# Patient Record
Sex: FEMALE | Race: WHITE
[De-identification: names, ages, dates, MRNs, and addresses within clinical notes are randomized per-mention and may not be internally consistent; named-entity substitution may affect disease eponyms.]

---

## 2020-12-04 ENCOUNTER — HOSPITAL ENCOUNTER (INPATIENT)
Dept: HOSPITAL 47 - EC | Age: 55
LOS: 4 days | Discharge: HOME HEALTH | DRG: 177 | End: 2020-12-08
Attending: HOSPITALIST | Admitting: HOSPITALIST
Payer: COMMERCIAL

## 2020-12-04 DIAGNOSIS — U07.1: Primary | ICD-10-CM

## 2020-12-04 DIAGNOSIS — Z83.6: ICD-10-CM

## 2020-12-04 DIAGNOSIS — G40.909: ICD-10-CM

## 2020-12-04 DIAGNOSIS — J96.01: ICD-10-CM

## 2020-12-04 DIAGNOSIS — Z88.2: ICD-10-CM

## 2020-12-04 DIAGNOSIS — E66.9: ICD-10-CM

## 2020-12-04 DIAGNOSIS — Z98.890: ICD-10-CM

## 2020-12-04 DIAGNOSIS — E86.0: ICD-10-CM

## 2020-12-04 DIAGNOSIS — Z79.899: ICD-10-CM

## 2020-12-04 DIAGNOSIS — Z80.9: ICD-10-CM

## 2020-12-04 DIAGNOSIS — Z82.5: ICD-10-CM

## 2020-12-04 DIAGNOSIS — J12.89: ICD-10-CM

## 2020-12-04 DIAGNOSIS — N17.9: ICD-10-CM

## 2020-12-04 LAB
ALBUMIN SERPL-MCNC: 3.5 G/DL (ref 3.5–5)
ALP SERPL-CCNC: 47 U/L (ref 38–126)
ALT SERPL-CCNC: 39 U/L (ref 4–34)
ANION GAP SERPL CALC-SCNC: 5 MMOL/L
APTT BLD: 22.6 SEC (ref 22–30)
AST SERPL-CCNC: 70 U/L (ref 14–36)
BASOPHILS # BLD AUTO: 0 K/UL (ref 0–0.2)
BASOPHILS NFR BLD AUTO: 0 %
BUN SERPL-SCNC: 29 MG/DL (ref 7–17)
CALCIUM SPEC-MCNC: 9.1 MG/DL (ref 8.4–10.2)
CHLORIDE SERPL-SCNC: 105 MMOL/L (ref 98–107)
CO2 BLDA-SCNC: 28 MMOL/L (ref 19–24)
CO2 SERPL-SCNC: 28 MMOL/L (ref 22–30)
D DIMER PPP FEU-MCNC: 0.73 MG/L FEU (ref ?–0.6)
EOSINOPHIL # BLD AUTO: 0.1 K/UL (ref 0–0.7)
EOSINOPHIL NFR BLD AUTO: 1 %
ERYTHROCYTE [DISTWIDTH] IN BLOOD BY AUTOMATED COUNT: 3.88 M/UL (ref 3.8–5.4)
ERYTHROCYTE [DISTWIDTH] IN BLOOD: 12.9 % (ref 11.5–15.5)
GLUCOSE SERPL-MCNC: 91 MG/DL (ref 74–99)
HCO3 BLDA-SCNC: 27 MMOL/L (ref 21–25)
HCT VFR BLD AUTO: 35.4 % (ref 34–46)
HGB BLD-MCNC: 11.9 GM/DL (ref 11.4–16)
INR PPP: 1 (ref ?–1.2)
LDH SPEC-CCNC: 1257 U/L (ref 313–618)
LYMPHOCYTES # SPEC AUTO: 0.7 K/UL (ref 1–4.8)
LYMPHOCYTES NFR SPEC AUTO: 10 %
MAGNESIUM SPEC-SCNC: 2.3 MG/DL (ref 1.6–2.3)
MCH RBC QN AUTO: 30.8 PG (ref 25–35)
MCHC RBC AUTO-ENTMCNC: 33.7 G/DL (ref 31–37)
MCV RBC AUTO: 91.4 FL (ref 80–100)
MONOCYTES # BLD AUTO: 0.3 K/UL (ref 0–1)
MONOCYTES NFR BLD AUTO: 4 %
NEUTROPHILS # BLD AUTO: 5.5 K/UL (ref 1.3–7.7)
NEUTROPHILS NFR BLD AUTO: 84 %
PCO2 BLDA: 36 MMHG (ref 35–45)
PH BLDA: 7.48 [PH] (ref 7.35–7.45)
PLATELET # BLD AUTO: 249 K/UL (ref 150–450)
PO2 BLDA: 78 MMHG (ref 83–108)
POTASSIUM SERPL-SCNC: 5.4 MMOL/L (ref 3.5–5.1)
PROT SERPL-MCNC: 6.6 G/DL (ref 6.3–8.2)
PT BLD: 10.2 SEC (ref 9–12)
SODIUM SERPL-SCNC: 138 MMOL/L (ref 137–145)
WBC # BLD AUTO: 6.6 K/UL (ref 3.8–10.6)

## 2020-12-04 PROCEDURE — 36600 WITHDRAWAL OF ARTERIAL BLOOD: CPT

## 2020-12-04 PROCEDURE — 85379 FIBRIN DEGRADATION QUANT: CPT

## 2020-12-04 PROCEDURE — 84145 PROCALCITONIN (PCT): CPT

## 2020-12-04 PROCEDURE — 87040 BLOOD CULTURE FOR BACTERIA: CPT

## 2020-12-04 PROCEDURE — 83735 ASSAY OF MAGNESIUM: CPT

## 2020-12-04 PROCEDURE — 85027 COMPLETE CBC AUTOMATED: CPT

## 2020-12-04 PROCEDURE — 83615 LACTATE (LD) (LDH) ENZYME: CPT

## 2020-12-04 PROCEDURE — 85730 THROMBOPLASTIN TIME PARTIAL: CPT

## 2020-12-04 PROCEDURE — 86140 C-REACTIVE PROTEIN: CPT

## 2020-12-04 PROCEDURE — 36415 COLL VENOUS BLD VENIPUNCTURE: CPT

## 2020-12-04 PROCEDURE — 83605 ASSAY OF LACTIC ACID: CPT

## 2020-12-04 PROCEDURE — 96372 THER/PROPH/DIAG INJ SC/IM: CPT

## 2020-12-04 PROCEDURE — 99285 EMERGENCY DEPT VISIT HI MDM: CPT

## 2020-12-04 PROCEDURE — 82805 BLOOD GASES W/O2 SATURATION: CPT

## 2020-12-04 PROCEDURE — 82728 ASSAY OF FERRITIN: CPT

## 2020-12-04 PROCEDURE — 80048 BASIC METABOLIC PNL TOTAL CA: CPT

## 2020-12-04 PROCEDURE — 96365 THER/PROPH/DIAG IV INF INIT: CPT

## 2020-12-04 PROCEDURE — 85025 COMPLETE CBC W/AUTO DIFF WBC: CPT

## 2020-12-04 PROCEDURE — 87635 SARS-COV-2 COVID-19 AMP PRB: CPT

## 2020-12-04 PROCEDURE — 94640 AIRWAY INHALATION TREATMENT: CPT

## 2020-12-04 PROCEDURE — 80053 COMPREHEN METABOLIC PANEL: CPT

## 2020-12-04 PROCEDURE — 85610 PROTHROMBIN TIME: CPT

## 2020-12-04 PROCEDURE — 71045 X-RAY EXAM CHEST 1 VIEW: CPT

## 2020-12-04 PROCEDURE — 93005 ELECTROCARDIOGRAM TRACING: CPT

## 2020-12-04 NOTE — XR
EXAMINATION TYPE: XR chest 1V portable

 

DATE OF EXAM: 12/4/2020

 

COMPARISON: NONE

 

HISTORY: Short of breath

 

TECHNIQUE:

 

FINDINGS: There is patchy bilateral pulmonary interstitial and airspace infiltrates. This is worse at
 the left lung base and right midlung. Heart size is fairly normal. There is no gross heart failure. 
Bony thorax is intact.

 

IMPRESSION: Bilateral pneumonia. Normal heart.

## 2020-12-04 NOTE — ED
General Adult HPI





- General


Source: patient, EMS


Mode of arrival: EMS


Limitations: no limitations





<Jeffrey Pagan JOSE CRUZ - Last Filed: 12/04/20 20:54>





<Katherine Miranda IKER - Last Filed: 12/04/20 23:44>





- General


Chief complaint: Shortness of Breath


Stated complaint: SOB


Time Seen by Provider: 12/04/20 19:31





- History of Present Illness


Initial comments: 


Dictation was produced using dragon dictation software. please excuse any 

grammatical, word or spelling errors. 





This patient was cared for during a federal and state declared state of 

emergency secondary to Covid 19





Chief Complaint: 55-year-old female presents today with worsening shortness of 

breath and fatigue.





History of Present Illness: 55-year-old female she has past medical history of 

seizure disorder she takes antiepileptic medication.  She states that for the 

last couple weeks she's been having worsening fatigue, weakness.  Patient was b

rought in by EMS from home.  Patient reports that she did have a conversation 

with her primary care physician who advised her to come to the emergency 

department.  Nursing received report from EMS reports that patient had initial 

oxygen level in the 70s.  Patient denies any lung or cardiac issues.  Patient 

has any constitutional symptoms.  She has no pain complaints.  She denies any 

diarrhea nausea.  No rhinorrhea








The ROS documented in this emergency department record has been reviewed and con

firmed by me.  Those systems with pertinent positive or negative responses have 

been documented in the HPI.  All other systems are other negative and/or 

noncontributory.








PHYSICAL EXAM:


General Impression: Alert and oriented x3, not in acute distress, resting 

comfortably, non-dyspneic appearing


HEENT: Normocephalic atraumatic, extra-ocular movements intact, pupils equal and

reactive to light bilaterally, mucous membranes moist.


Cardiovascular: Heart regular rate and rhythm


Chest: Able to complete full sentences, no retractions, no tachypnea


Abdomen: abdomen soft, non-tender, non-distended, no organomegaly


Musculoskeletal: Pulses present and equal in all extremities, no peripheral 

edema


Motor:  no focal deficits noted


Neurological: CN II-XII grossly intact, no focal motor or sensory deficits noted


Skin: Intact with no visualized rashes


Psych: Normal affect and mood





ED course: 55-year-old female presents with hypoxic respiratory failure and 

weeks of worsening dyspnea and fatigue. 


Vital signs upon arrival shows 97% on 10 L nonrebreather.  Patient was trialed 

on room air and dropped down to 82%.  Patient denies having a history of lung 

disease.  She does not wear oxygen chronically at home.  Patient placed on 2 L 

nasal cannula with oxygen saturation rates in the high 80s low 90s.  Chest x-ray

shows bilateral pneumonia.  Patient care signed out to Dr. Miranda for follow-up 

of pending labs.





EKG interpretation: Ventricular rate 66, normal sinus rhythm,.  156, QRS 88, QTc

431. No LA prolongation, no QTC prolongation.  Note EKG for comparison.  There 

is a T-wave inversion in lead 3 and aVF and V3.  No ST elevations..  Overall, 

this EKG is nonspecific.





 (Jeffrey Pagan)





- Related Data


                                Home Medications











 Medication  Instructions  Recorded  Confirmed


 


Ferrous Sulfate [Feosol] 325 mg PO DAILY 12/04/20 12/04/20


 


Potassium Gluconate 99 mg PO DAILY 12/04/20 12/04/20


 


Sertraline [Zoloft] 100 mg PO HS 12/04/20 12/04/20


 


cloBAZam [Clobazam] 50 mg PO HS 12/04/20 12/04/20


 


lamoTRIgine 300 mg PO BID 12/04/20 12/04/20











                                    Allergies











Allergy/AdvReac Type Severity Reaction Status Date / Time


 


Sulfa (Sulfonamide AdvReac  Unknown Verified 12/04/20 19:50





Antibiotics)     














Review of Systems


ROS Other: All systems not noted in ROS Statement are negative.





<Jeffrey Pagan - Last Filed: 12/04/20 20:54>


ROS Other: All systems not noted in ROS Statement are negative.





<Katherine Miranda - Last Filed: 12/04/20 23:44>


ROS Statement: 


Those systems with pertinent positive or pertinent negative responses have been 

documented in the HPI.








Past Medical History


Past Medical History: Seizure Disorder


History of Any Multi-Drug Resistant Organisms: None Reported


Past Surgical History: Orthopedic Surgery


Past Psychological History: No Psychological Hx Reported


Smoking Status: Never smoker


Past Alcohol Use History: None Reported


Past Drug Use History: None Reported





<Jeffrey Pagan - Last Filed: 12/04/20 20:54>





General Exam


Limitations: no limitations





<Jeffrey Pagan - Last Filed: 12/04/20 20:54>





Course


                                   Vital Signs











  12/04/20 12/04/20 12/04/20





  19:33 20:20 20:45


 


Temperature 98.6 F  


 


Pulse Rate 65 63 64


 


Respiratory 16 16 16





Rate   


 


Blood Pressure 111/63  109/56


 


O2 Sat by Pulse 97 84 L 94 L





Oximetry   














  12/04/20





  22:17


 


Temperature 98.6 F


 


Pulse Rate 70


 


Respiratory 18





Rate 


 


Blood Pressure 116/53


 


O2 Sat by Pulse 95





Oximetry 














Medical Decision Making





- Lab Data


Result diagrams: 


                                 12/04/20 20:51





                                 12/04/20 20:51





<Katherine Miranda - Last Filed: 12/04/20 23:44>





- Medical Decision Making





Patient was signed out to me by Dr. Pagan.  Patient saturating 96% on 4 L.  

Laboratory studies are reviewed.  Chest x-ray does demonstrate bilateral 

pneumonia.  Rapid Covid is negative however this is sent for PCR. Dr. Pagan 

did discuss the case with aMrtha from [facility] who accepted admission. Patient cur

rently awaiting a bed on the floor.  (Katherine Miranda)





- Lab Data


                                   Lab Results











  12/04/20 12/04/20 12/04/20 Range/Units





  20:51 20:51 20:51 


 


WBC  6.6    (3.8-10.6)  k/uL


 


RBC  3.88    (3.80-5.40)  m/uL


 


Hgb  11.9    (11.4-16.0)  gm/dL


 


Hct  35.4    (34.0-46.0)  %


 


MCV  91.4    (80.0-100.0)  fL


 


MCH  30.8    (25.0-35.0)  pg


 


MCHC  33.7    (31.0-37.0)  g/dL


 


RDW  12.9    (11.5-15.5)  %


 


Plt Count  249    (150-450)  k/uL


 


MPV  7.1    


 


Neutrophils %  84    %


 


Lymphocytes %  10    %


 


Monocytes %  4    %


 


Eosinophils %  1    %


 


Basophils %  0    %


 


Neutrophils #  5.5    (1.3-7.7)  k/uL


 


Lymphocytes #  0.7 L    (1.0-4.8)  k/uL


 


Monocytes #  0.3    (0-1.0)  k/uL


 


Eosinophils #  0.1    (0-0.7)  k/uL


 


Basophils #  0.0    (0-0.2)  k/uL


 


PT   10.2   (9.0-12.0)  sec


 


INR   1.0   (<1.2)  


 


APTT   22.6   (22.0-30.0)  sec


 


D-Dimer   0.73 H   (<0.60)  mg/L FEU


 


Sample Site     


 


ABG pH     (7.35-7.45)  


 


ABG pCO2     (35-45)  mmHg


 


ABG pO2     ()  mmHg


 


ABG HCO3     (21-25)  mmol/L


 


ABG Total CO2     (19-24)  mmol/L


 


ABG O2 Saturation     (94-97)  %


 


ABG Base Excess     mmol/L


 


Toni Test     


 


FiO2     %


 


Sodium    138  (137-145)  mmol/L


 


Potassium    5.4 H  (3.5-5.1)  mmol/L


 


Chloride    105  ()  mmol/L


 


Carbon Dioxide    28  (22-30)  mmol/L


 


Anion Gap    5  mmol/L


 


BUN    29 H  (7-17)  mg/dL


 


Creatinine    0.96  (0.52-1.04)  mg/dL


 


Est GFR (CKD-EPI)AfAm    77  (>60 ml/min/1.73 sqM)  


 


Est GFR (CKD-EPI)NonAf    67  (>60 ml/min/1.73 sqM)  


 


Glucose    91  (74-99)  mg/dL


 


Plasma Lactic Acid Ronen     (0.7-2.0)  mmol/L


 


Calcium    9.1  (8.4-10.2)  mg/dL


 


Magnesium    2.3  (1.6-2.3)  mg/dL


 


Total Bilirubin    0.8  (0.2-1.3)  mg/dL


 


AST    70 H  (14-36)  U/L


 


ALT    39 H  (4-34)  U/L


 


Alkaline Phosphatase    47  ()  U/L


 


Lactate Dehydrogenase    1257 H  (313-618)  U/L


 


C-Reactive Protein    241.3 H  (<10.0)  mg/L


 


Total Protein    6.6  (6.3-8.2)  g/dL


 


Albumin    3.5  (3.5-5.0)  g/dL


 


Coronavirus (PCR)     (Not Detectd)  














  12/04/20 12/04/20 12/04/20 Range/Units





  20:51 20:51 21:34 


 


WBC     (3.8-10.6)  k/uL


 


RBC     (3.80-5.40)  m/uL


 


Hgb     (11.4-16.0)  gm/dL


 


Hct     (34.0-46.0)  %


 


MCV     (80.0-100.0)  fL


 


MCH     (25.0-35.0)  pg


 


MCHC     (31.0-37.0)  g/dL


 


RDW     (11.5-15.5)  %


 


Plt Count     (150-450)  k/uL


 


MPV     


 


Neutrophils %     %


 


Lymphocytes %     %


 


Monocytes %     %


 


Eosinophils %     %


 


Basophils %     %


 


Neutrophils #     (1.3-7.7)  k/uL


 


Lymphocytes #     (1.0-4.8)  k/uL


 


Monocytes #     (0-1.0)  k/uL


 


Eosinophils #     (0-0.7)  k/uL


 


Basophils #     (0-0.2)  k/uL


 


PT     (9.0-12.0)  sec


 


INR     (<1.2)  


 


APTT     (22.0-30.0)  sec


 


D-Dimer     (<0.60)  mg/L FEU


 


Sample Site    Left Radial  


 


ABG pH    7.48 H  (7.35-7.45)  


 


ABG pCO2    36  (35-45)  mmHg


 


ABG pO2    78 L  ()  mmHg


 


ABG HCO3    27 H  (21-25)  mmol/L


 


ABG Total CO2    28 H  (19-24)  mmol/L


 


ABG O2 Saturation    96.9  (94-97)  %


 


ABG Base Excess    3.5  mmol/L


 


Toni Test    Yes  


 


FiO2    36  %


 


Sodium     (137-145)  mmol/L


 


Potassium     (3.5-5.1)  mmol/L


 


Chloride     ()  mmol/L


 


Carbon Dioxide     (22-30)  mmol/L


 


Anion Gap     mmol/L


 


BUN     (7-17)  mg/dL


 


Creatinine     (0.52-1.04)  mg/dL


 


Est GFR (CKD-EPI)AfAm     (>60 ml/min/1.73 sqM)  


 


Est GFR (CKD-EPI)NonAf     (>60 ml/min/1.73 sqM)  


 


Glucose     (74-99)  mg/dL


 


Plasma Lactic Acid Ronen  1.2    (0.7-2.0)  mmol/L


 


Calcium     (8.4-10.2)  mg/dL


 


Magnesium     (1.6-2.3)  mg/dL


 


Total Bilirubin     (0.2-1.3)  mg/dL


 


AST     (14-36)  U/L


 


ALT     (4-34)  U/L


 


Alkaline Phosphatase     ()  U/L


 


Lactate Dehydrogenase     (313-618)  U/L


 


C-Reactive Protein     (<10.0)  mg/L


 


Total Protein     (6.3-8.2)  g/dL


 


Albumin     (3.5-5.0)  g/dL


 


Coronavirus (PCR)   Not Detected   (Not Detectd)  














Disposition





<Jeffrey Pagan - Last Filed: 12/04/20 20:54>


Is patient prescribed a controlled substance at d/c from ED?: No


Decision to Admit Reason: Admit from EC


Decision Date: 12/04/20


Decision Time: 22:02





<Katherine Miranda - Last Filed: 12/04/20 23:44>


Clinical Impression: 


 Hypoxia, Suspected COVID-19 virus infection, Pneumonia





Disposition: ADMITTED AS IP TO THIS HOSP


Condition: Serious

## 2020-12-05 LAB
ANION GAP SERPL CALC-SCNC: 8 MMOL/L
BASOPHILS # BLD AUTO: 0 K/UL (ref 0–0.2)
BASOPHILS NFR BLD AUTO: 1 %
BUN SERPL-SCNC: 26 MG/DL (ref 7–17)
CALCIUM SPEC-MCNC: 9.1 MG/DL (ref 8.4–10.2)
CHLORIDE SERPL-SCNC: 109 MMOL/L (ref 98–107)
CO2 SERPL-SCNC: 24 MMOL/L (ref 22–30)
EOSINOPHIL # BLD AUTO: 0 K/UL (ref 0–0.7)
EOSINOPHIL NFR BLD AUTO: 0 %
ERYTHROCYTE [DISTWIDTH] IN BLOOD BY AUTOMATED COUNT: 4.17 M/UL (ref 3.8–5.4)
ERYTHROCYTE [DISTWIDTH] IN BLOOD: 12.7 % (ref 11.5–15.5)
FERRITIN SERPL-MCNC: 1946.8 NG/ML (ref 10–291)
GLUCOSE SERPL-MCNC: 132 MG/DL (ref 74–99)
HCT VFR BLD AUTO: 39.2 % (ref 34–46)
HGB BLD-MCNC: 12.7 GM/DL (ref 11.4–16)
LYMPHOCYTES # SPEC AUTO: 0.5 K/UL (ref 1–4.8)
LYMPHOCYTES NFR SPEC AUTO: 8 %
MCH RBC QN AUTO: 30.6 PG (ref 25–35)
MCHC RBC AUTO-ENTMCNC: 32.5 G/DL (ref 31–37)
MCV RBC AUTO: 94 FL (ref 80–100)
MONOCYTES # BLD AUTO: 0.1 K/UL (ref 0–1)
MONOCYTES NFR BLD AUTO: 2 %
NEUTROPHILS # BLD AUTO: 5.3 K/UL (ref 1.3–7.7)
NEUTROPHILS NFR BLD AUTO: 88 %
PLATELET # BLD AUTO: 264 K/UL (ref 150–450)
POTASSIUM SERPL-SCNC: 5.4 MMOL/L (ref 3.5–5.1)
SODIUM SERPL-SCNC: 141 MMOL/L (ref 137–145)
WBC # BLD AUTO: 6 K/UL (ref 3.8–10.6)

## 2020-12-05 RX ADMIN — ALBUTEROL SULFATE SCH PUFF: 90 AEROSOL, METERED RESPIRATORY (INHALATION) at 11:30

## 2020-12-05 RX ADMIN — OXYCODONE HYDROCHLORIDE AND ACETAMINOPHEN SCH MG: 500 TABLET ORAL at 09:46

## 2020-12-05 RX ADMIN — ALBUTEROL SULFATE SCH PUFF: 90 AEROSOL, METERED RESPIRATORY (INHALATION) at 20:53

## 2020-12-05 RX ADMIN — SERTRALINE HYDROCHLORIDE SCH MG: 100 TABLET ORAL at 21:06

## 2020-12-05 RX ADMIN — ENOXAPARIN SODIUM SCH MG: 40 INJECTION SUBCUTANEOUS at 09:45

## 2020-12-05 RX ADMIN — FAMOTIDINE SCH MG: 20 TABLET, FILM COATED ORAL at 21:06

## 2020-12-05 RX ADMIN — Medication SCH MG: at 21:06

## 2020-12-05 RX ADMIN — ALBUTEROL SULFATE SCH PUFF: 90 AEROSOL, METERED RESPIRATORY (INHALATION) at 14:41

## 2020-12-05 RX ADMIN — Medication SCH UNIT: at 09:46

## 2020-12-05 RX ADMIN — FAMOTIDINE SCH MG: 20 TABLET, FILM COATED ORAL at 09:46

## 2020-12-05 RX ADMIN — Medication SCH MG: at 09:46

## 2020-12-05 NOTE — P.CNPUL
History of Present Illness


Consult date: 12/05/20


Requesting physician: Kailyn Bonner


Reason for consult: dyspnea, abnormal CXR/CT


Chief complaint: Weakness, fatigue, cough, congestion


History of present illness: 





This is a pleasant 55-year-old female patient with a known history of seizures. 

She presented to the emergency room this evening with complaints of increasing 

shortness of breath cough congestion weakness.  He has been having symptoms for 

approximately 2 weeks that became worse in the last week or so.  She was found 

to be hypoxemic with O2 saturation at 84%.  Chest x-ray does show evidence of 

patchy bilateral pulmonary interstitial and airspace infiltrates suspicious for 

CoVID pneumonia.  Rapid screen was negative.  PCR is pending.  White count 6.0. 

Hemoglobin 12.7.  Lymphocytes 0.5.  D-dimer 0.73.  Arterial blood gases on 36% 

FiO2 revealed a pO2 of 78, pCO2 36, pH 7.48.  Sodium 141.  Potassium 5.4.  

Creatinine 0.95.  Ferritin 1946.  LDH 1257.  C-reactive protein 241.  Pro-

calcitonin 0.37.  She is seen today in consultation in the emergency room.  She 

is resting comfortably on a stretcher.  Awake and alert in no acute distress.  

She is dyspneic with minimal exertion.  Dyspneic with conversation.  Feeling a 

bit better today compared to yesterday.  She has been given Lovenox 40 mg subcu 

once.  Dexamethasone 6 mg by mouth once.  Ceftriaxone and azithromycin.





Review of Systems





REVIEW OF SYSTEMS:


CONSTITUTIONAL: Weakness, fatigue.  Denies any recent significant weight loss or

weight gain.


EYES: Denies change in vision.


EARS, NOSE, MOUTH, THROAT: Denies headaches, denies sore throat.


CARDIOVASCULAR: Denies chest pain, palpitations or syncopal episodes.


RESPIRATORY: Positive for shortness of breath, cough, congestion no hemoptysis.


GASTROINTESTINAL: Denies change in appetite, denies abdominal pain


GENITOURINARY: Denies hematuria, denies infections.


MUSKULOSKELETAL: Denies pain, denies swelling.


INTEGUMENTARY: Denies rash, denies eczema.


NEUROLOGICAL: Denies recent memory loss, no recent seizure activity. 


PSYCHIATRIC: Denies anxiety, denies depression.


HEMATOLOGIC/LYMPHATIC: Denies anemia, denies enlarged lymph nodes.








Past Medical History


Past Medical History: Seizure Disorder


History of Any Multi-Drug Resistant Organisms: None Reported


Past Surgical History: Orthopedic Surgery


Past Psychological History: No Psychological Hx Reported


Smoking Status: Never smoker


Past Alcohol Use History: None Reported


Past Drug Use History: None Reported





Medications and Allergies


                                Home Medications











 Medication  Instructions  Recorded  Confirmed  Type


 


Ferrous Sulfate [Feosol] 325 mg PO DAILY 12/04/20 12/04/20 History


 


Potassium Gluconate 99 mg PO DAILY 12/04/20 12/04/20 History


 


Sertraline [Zoloft] 100 mg PO HS 12/04/20 12/04/20 History


 


cloBAZam [Clobazam] 50 mg PO HS 12/04/20 12/04/20 History


 


lamoTRIgine 300 mg PO BID 12/04/20 12/04/20 History








                                    Allergies











Allergy/AdvReac Type Severity Reaction Status Date / Time


 


Sulfa (Sulfonamide AdvReac  Unknown Verified 12/04/20 19:50





Antibiotics)     














Physical Exam


Vitals: 


                                   Vital Signs











  Temp Pulse Resp BP Pulse Ox


 


 12/05/20 07:44  97.9 F  62  18  139/72  94 L


 


 12/05/20 05:11   66  14  126/76  96


 


 12/05/20 02:00   75  14  123/76  95


 


 12/05/20 00:17  99.1 F  70  14  125/80  88 L


 


 12/04/20 22:17  98.6 F  70  18  116/53  95


 


 12/04/20 20:45   64  16  109/56  94 L


 


 12/04/20 20:20   63  16   84 L


 


 12/04/20 19:33  98.6 F  65  16  111/63  97








                                Intake and Output











 12/04/20 12/05/20 12/05/20





 22:59 06:59 14:59


 


Other:   


 


  Weight 104.326 kg  














GENERAL EXAM: Alert, pleasant 55-year-old female patient, on 4 L nasal cannula, 

fairly comfortable in no apparent distress.


HEAD: Normocephalic.


EYES: Normal reaction of pupils, equal size.


NOSE: Clear with pink turbinates.


THROAT: No erythema or exudates.


NECK: No masses, no JVD.


CHEST: No chest wall deformity.


LUNGS: Equal air entry with bilateral scattered rhonchi


CVS: S1 and S2 normal with no audible murmur, regular rhythm.


ABDOMEN: No hepatosplenomegaly, normal bowel sounds, no guarding or rigidity.


SPINE: No scoliosis or deformity


SKIN: No rashes


CENTRAL NERVOUS SYSTEM: No focal deficits, tone is normal in all 4 extremities.


EXTREMITIES: There is no peripheral edema.  No clubbing, no cyanosis.  

Peripheral pulses are intact.





Results





- Laboratory Findings


CBC and BMP: 


                                 12/05/20 07:41





                                 12/05/20 07:41


ABG











ABG pH  7.48  (7.35-7.45)  H  12/04/20  21:34    


 


ABG pCO2  36 mmHg (35-45)   12/04/20  21:34    


 


ABG pO2  78 mmHg ()  L  12/04/20  21:34    


 


ABG O2 Saturation  96.9 % (94-97)   12/04/20  21:34    





PT/INR, D-dimer











PT  10.2 sec (9.0-12.0)   12/04/20  20:51    


 


INR  1.0  (<1.2)   12/04/20  20:51    


 


D-Dimer  0.73 mg/L FEU (<0.60)  H  12/04/20  20:51    








Abnormal lab findings: 


                                  Abnormal Labs











  12/04/20 12/04/20 12/04/20





  20:51 20:51 20:51


 


Lymphocytes #  0.7 L  


 


D-Dimer   0.73 H 


 


ABG pH   


 


ABG pO2   


 


ABG HCO3   


 


ABG Total CO2   


 


Potassium    5.4 H


 


Chloride   


 


BUN    29 H


 


Glucose   


 


Ferritin    1946.8 H


 


AST    70 H


 


ALT    39 H


 


Lactate Dehydrogenase    1257 H


 


C-Reactive Protein    241.3 H


 


Procalcitonin   














  12/04/20 12/04/20 12/05/20





  20:51 21:34 07:41


 


Lymphocytes #    0.5 L


 


D-Dimer   


 


ABG pH   7.48 H 


 


ABG pO2   78 L 


 


ABG HCO3   27 H 


 


ABG Total CO2   28 H 


 


Potassium   


 


Chloride   


 


BUN   


 


Glucose   


 


Ferritin   


 


AST   


 


ALT   


 


Lactate Dehydrogenase   


 


C-Reactive Protein   


 


Procalcitonin  0.37 H  














  12/05/20





  07:41


 


Lymphocytes # 


 


D-Dimer 


 


ABG pH 


 


ABG pO2 


 


ABG HCO3 


 


ABG Total CO2 


 


Potassium  5.4 H


 


Chloride  109 H


 


BUN  26 H


 


Glucose  132 H


 


Ferritin 


 


AST 


 


ALT 


 


Lactate Dehydrogenase 


 


C-Reactive Protein 


 


Procalcitonin 














- Diagnostic Findings


Chest x-ray: image reviewed





Assessment and Plan


Assessment: 





1 Acute hypoxic respiratory failure secondary to bilateral patchy infiltrates, 

suspect CoVID 19 pneumonia versus community-acquired pneumonia, PCR pending.  

Symptoms started nearly 2 weeks ago.  Outside the window for Remdesivir if pos

itive.





2 Elevated inflammatory markers suspect secondary to covert 19





3 History of seizures





Plan:





The patient was seen and evaluated by Dr. Stephenson


Chest x-ray, ABGs and labs reviewed


Suspect CoVID 19 pneumonia


Add Lovenox, dexamethasone


Add vitamin C, vitamin D, zinc, Pepcid, melatonin


Titrate the FiO2 as tolerated


Add albuterol


We will continue to follow and make further recommendations based on her 

clinical status





I, the cosigning physician, performed a history & physical examination of the 

patient. Lungs sounds bilateral scattered rhonchi.  Maintaining good O2 saturati

ons in the 90s on 4 L/m per nasal cannula.  I discussed the assessment and plan 

of care with my nurse practitioner, Sonia Iqbal. I attest to the above note as 

dictated by her.


Time with Patient: Greater than 30

## 2020-12-05 NOTE — P.HPIM
History of Present Illness


H&P Date: 12/05/20


Chief Complaint: Generalized weakness and cough





Patient is a 55-year-old female with a known history of seizure disorder came to

ER with complaints of worsening shortness of breath cough and congestion and 

generalized weakness and body aches.  Patient has been having symptoms for the 

past 2 weeks and worsened since last week.  Patient was found to be hypoxic with

pulse ox level 84% on room air on admission.  Chest x-ray showed bilateral 

pneumonia.  Normal heart.  EKG showed normal sinus rhythm.  Laboratory data 

showed WBC 6.6, hemoglobin 11.9 and platelets 2 4990 lymphocytes 0.7


D-dimer level is 0.73


Blood gases showed pH of 7.48 PCO2 36 and PO2 78


Sodium 138, potassium 5.4 and BUN 29, creatinine 0.96


Ferritin level is 1946


COVID-19 PCR not detected








Review of Systems








Constitutional: Patient does have fever.  No chills.  Generalized weakness and 

fatigue.  


Abdomen: Patient denied nausea vomiting and diarrhea and abdominal pain.


Cardiovascular: Patient denies any chest pain or short of breath no 

palpitations.


Respiratory: Cough without sputum production and shortness of breath.


Neurologic: Patient denied any numbness or tingling headache.


Musculoskeletal: Patient denies any complaints of joint swelling or deformity.


Skin: Negative


Psychiatric: Negative


Endocrine: No heat or cold intolerance.  No recent weight gain.


Genitourinary: No dysuria or hematuria.


All other 14 point ROS negative except the above





Past Medical History


Past Medical History: Seizure Disorder


History of Any Multi-Drug Resistant Organisms: None Reported


Past Surgical History: Orthopedic Surgery


Past Psychological History: No Psychological Hx Reported


Smoking Status: Never smoker


Past Alcohol Use History: None Reported


Past Drug Use History: None Reported





- Past Family History


  ** Father


Family Medical History: Cancer





  ** Mother


Family Medical History: COPD





Medications and Allergies


                                Home Medications











 Medication  Instructions  Recorded  Confirmed  Type


 


Ferrous Sulfate [Feosol] 325 mg PO DAILY 12/04/20 12/04/20 History


 


Potassium Gluconate 99 mg PO DAILY 12/04/20 12/04/20 History


 


Sertraline [Zoloft] 100 mg PO HS 12/04/20 12/04/20 History


 


cloBAZam [Clobazam] 50 mg PO HS 12/04/20 12/04/20 History


 


lamoTRIgine 300 mg PO BID 12/04/20 12/04/20 History








                                    Allergies











Allergy/AdvReac Type Severity Reaction Status Date / Time


 


Sulfa (Sulfonamide AdvReac  Unknown Verified 12/04/20 19:50





Antibiotics)     














Physical Exam


Vitals: 


                                   Vital Signs











  Temp Pulse Resp BP Pulse Ox


 


 12/05/20 07:44  97.9 F  62  18  139/72  94 L


 


 12/05/20 05:11   66  14  126/76  96


 


 12/05/20 02:00   75  14  123/76  95


 


 12/05/20 00:17  99.1 F  70  14  125/80  88 L


 


 12/04/20 22:17  98.6 F  70  18  116/53  95


 


 12/04/20 20:45   64  16  109/56  94 L


 


 12/04/20 20:20   63  16   84 L


 


 12/04/20 19:33  98.6 F  65  16  111/63  97








                                Intake and Output











 12/04/20 12/05/20 12/05/20





 22:59 06:59 14:59


 


Other:   


 


  Weight 104.326 kg  














PHYSICAL EXAMINATION: 


Patient is lying in the bed comfortably, no acute distress, awake alert and 

oriented.Feels weak and lethargic.. 


HEENT: Normocephalic. Neck is supple. Pupils reactive. Nostrils clear. Oral 

cavity is moist. Ears reveal no drainage. 


Neck reveals no JVD, carotid bruits, or thyromegaly. 


CHEST EXAMINATION: Trachea is central. Symmetrical expansion. Lung fields clear 

to auscultation and percussion. Bibasilar diminished air entry.


CARDIAC: Normal S1, S2 with no gallops. No murmurs 


ABDOMEN: Soft. Bowel sounds normal. No organomegaly. No abdominal bruits. 


Extremities: reveal no edema.  No clubbing or cyanosis


Neurologically awake, alert, oriented x3 with well-coordinated movements.  No 

focal deficits noted


Skin: No rash or skin lesions. 


Psychiatric: Coperative.  Nonsuicidal


Musculoskeletal: No joint swelling or deformity.  Normal range of motion.





Results


CBC & Chem 7: 


                                 12/05/20 07:41





                                 12/05/20 07:41


Labs: 


                  Abnormal Lab Results - Last 24 Hours (Table)











  12/04/20 12/04/20 12/04/20 Range/Units





  20:51 20:51 20:51 


 


Lymphocytes #  0.7 L    (1.0-4.8)  k/uL


 


D-Dimer   0.73 H   (<0.60)  mg/L FEU


 


ABG pH     (7.35-7.45)  


 


ABG pO2     ()  mmHg


 


ABG HCO3     (21-25)  mmol/L


 


ABG Total CO2     (19-24)  mmol/L


 


Potassium    5.4 H  (3.5-5.1)  mmol/L


 


Chloride     ()  mmol/L


 


BUN    29 H  (7-17)  mg/dL


 


Glucose     (74-99)  mg/dL


 


Ferritin    1946.8 H  (10.0-291.0)  ng/mL


 


AST    70 H  (14-36)  U/L


 


ALT    39 H  (4-34)  U/L


 


Lactate Dehydrogenase    1257 H  (313-618)  U/L


 


C-Reactive Protein    241.3 H  (<10.0)  mg/L


 


Procalcitonin     (0.02-0.09)  ng/mL














  12/04/20 12/04/20 12/05/20 Range/Units





  20:51 21:34 07:41 


 


Lymphocytes #    0.5 L  (1.0-4.8)  k/uL


 


D-Dimer     (<0.60)  mg/L FEU


 


ABG pH   7.48 H   (7.35-7.45)  


 


ABG pO2   78 L   ()  mmHg


 


ABG HCO3   27 H   (21-25)  mmol/L


 


ABG Total CO2   28 H   (19-24)  mmol/L


 


Potassium     (3.5-5.1)  mmol/L


 


Chloride     ()  mmol/L


 


BUN     (7-17)  mg/dL


 


Glucose     (74-99)  mg/dL


 


Ferritin     (10.0-291.0)  ng/mL


 


AST     (14-36)  U/L


 


ALT     (4-34)  U/L


 


Lactate Dehydrogenase     (313-618)  U/L


 


C-Reactive Protein     (<10.0)  mg/L


 


Procalcitonin  0.37 H    (0.02-0.09)  ng/mL














  12/05/20 Range/Units





  07:41 


 


Lymphocytes #   (1.0-4.8)  k/uL


 


D-Dimer   (<0.60)  mg/L FEU


 


ABG pH   (7.35-7.45)  


 


ABG pO2   ()  mmHg


 


ABG HCO3   (21-25)  mmol/L


 


ABG Total CO2   (19-24)  mmol/L


 


Potassium  5.4 H  (3.5-5.1)  mmol/L


 


Chloride  109 H  ()  mmol/L


 


BUN  26 H  (7-17)  mg/dL


 


Glucose  132 H  (74-99)  mg/dL


 


Ferritin   (10.0-291.0)  ng/mL


 


AST   (14-36)  U/L


 


ALT   (4-34)  U/L


 


Lactate Dehydrogenase   (313-618)  U/L


 


C-Reactive Protein   (<10.0)  mg/L


 


Procalcitonin   (0.02-0.09)  ng/mL














Thrombosis Risk Factor Assmnt





- DVT/VTE Prophylaxis


DVT/VTE Prophylaxis: Pharmacologic Prophylaxis ordered





Assessment and Plan


Assessment: 








Suspected acute COVID-19 pneumonia.  PCR not detected.


Acute hypoxic respiratory failure secondary to pneumonia as above


Bilateral patchy infiltrates on chest x-ray.


Seizure disorder currently on antiepileptic medications at home.


DVT prophylaxis with Lovenox


Obesity with a BMI 34.0





Plan:


Patient will be continued on oxygen via nasal cannula and titrate FiO2.  Patient

 was started on dexamethasone and Lovenox subcu.  Continue with vitamin 

supplementation and follow-up closely.  Pulmonary is on board.  Further 

recommendations based on the clinical course.


Time with Patient: Greater than 30

## 2020-12-06 LAB
FERRITIN SERPL-MCNC: 1840.1 NG/ML (ref 10–291)
LDH SPEC-CCNC: 816 U/L (ref 313–618)

## 2020-12-06 RX ADMIN — SERTRALINE HYDROCHLORIDE SCH MG: 100 TABLET ORAL at 20:45

## 2020-12-06 RX ADMIN — Medication SCH MG: at 10:19

## 2020-12-06 RX ADMIN — ALBUTEROL SULFATE SCH PUFF: 90 AEROSOL, METERED RESPIRATORY (INHALATION) at 08:08

## 2020-12-06 RX ADMIN — FAMOTIDINE SCH MG: 20 TABLET, FILM COATED ORAL at 20:46

## 2020-12-06 RX ADMIN — Medication SCH UNIT: at 10:19

## 2020-12-06 RX ADMIN — OXYCODONE HYDROCHLORIDE AND ACETAMINOPHEN SCH MG: 500 TABLET ORAL at 10:18

## 2020-12-06 RX ADMIN — FAMOTIDINE SCH MG: 20 TABLET, FILM COATED ORAL at 10:18

## 2020-12-06 RX ADMIN — Medication SCH: at 20:46

## 2020-12-06 RX ADMIN — ENOXAPARIN SODIUM SCH MG: 40 INJECTION SUBCUTANEOUS at 10:19

## 2020-12-06 RX ADMIN — ALBUTEROL SULFATE SCH PUFF: 90 AEROSOL, METERED RESPIRATORY (INHALATION) at 11:36

## 2020-12-06 RX ADMIN — ALBUTEROL SULFATE SCH PUFF: 90 AEROSOL, METERED RESPIRATORY (INHALATION) at 17:03

## 2020-12-06 RX ADMIN — ALBUTEROL SULFATE SCH PUFF: 90 AEROSOL, METERED RESPIRATORY (INHALATION) at 20:08

## 2020-12-06 NOTE — PN
PROGRESS NOTE



PULMONARY/CRITICAL CARE PROGRESS NOTE:



DATE OF SERVICE:

December 6, 2020



This is a very pleasant 55-year-old female who we saw yesterday in consultation.  She

presented to the emergency room with complaints of increasing shortness of breath,

cough, congestion, and weakness.  Her initial rapid COVID test was negative but her PCR

test is positive.  We suspected COVID-19 pneumonia all along.  Currently, she is

feeling about the same, no better or no worse.



Her complaints include shortness of breath, chest congestion, cough, and chest

tightness.  She does cough when she takes a deep breath.  In addition, she has a

history of seizures.  Her inflammatory markers were elevated suggesting COVID-19

infection.



PHYSICAL EXAMINATION:

VITAL SIGNS: Current vital signs include temperature 97.7, heart rate 71, respiratory

rate 20, blood pressure 112/57.  Her mean arterial pressure is 75, 4 L saturations 90%.

Appears in no acute distress.

HEENT: Examination is grossly unremarkable.  Nasal O2 noted.

NECK:  Supple, full range of motion.  No adenopathy.  Neck veins flat.

CARDIOVASCULAR: Examination reveals regular rhythm and rate.  Heart rate 71.  S1, S2

normal.  No S3, S4, or murmur.

LUNGS: Reveal coarse bilateral rhonchi.  No wheezes or crackles.  Breath sounds equal.

ABDOMEN:  Soft, bowel sounds are heard.

EXTREMITIES are intact.  No cyanosis, clubbing, or edema.

SKIN: Without rash.

NEUROLOGIC: Examination is nonfocal.



LABS:

Reviewed.  Current labs include a D-dimer 0.45.  Her ferritin was 1840.  Her LDH was

816 and her C-reactive protein was 128.  Again, her coronavirus PCR was positive.



Microbiology is currently pending or negative.



Chest x-ray was done December 4.  It shows bilateral pneumonia.



CURRENT MEDICATIONS:

Reviewed.  She is on Tylenol, albuterol inhaler, vitamin C, vitamin D3, clobazam,

Decadron, Lovenox famotidine, Lamictal, melatonin, Narcan, Zoloft, and zinc.



ASSESSMENT:

1. COVID-19 pneumonitis with mild-to-moderate hypoxemic respiratory failure.  The

    patient outside the window for Remdesivir.

2. Elevated inflammatory markers consistent with COVID-19 infection.

3. History of seizures.



PLAN:

Patient's medications are appropriate.  She is on Decadron, vitamin C, vitamin D3, and

zinc.  We will continue to follow.  No additional recommendations are made.  Chest x-

ray in the morning.  One might consider convalescent plasma.  We will follow.





MMODL / IJN: [id number] / Job#: [id number]

## 2020-12-06 NOTE — P.PN
Subjective


Progress Note Date: 12/06/20


Patient is a 55-year-old female with a known history of seizure disorder came to

ER with complaints of worsening shortness of breath cough and congestion and 

generalized weakness and body aches.  Patient has been having symptoms for the 

past 2 weeks and worsened since last week.  Patient was found to be hypoxic with

pulse ox level 84% on room air on admission.  Chest x-ray showed bilateral 

pneumonia.  Normal heart.  EKG showed normal sinus rhythm.  Laboratory data 

showed WBC 6.6, hemoglobin 11.9 and platelets 2 4990 lymphocytes 0.7


D-dimer level is 0.73


Blood gases showed pH of 7.48 PCO2 36 and PO2 78


Sodium 138, potassium 5.4 and BUN 29, creatinine 0.96


Ferritin level is 1946


COVID-19 PCR not detected





12/6/2020


Patient is currently sitting in the chair awake alert oriented x3.  Patient is 

out of window for Tamiflu treatment since the patient has been having symptoms 

more than 10 days.  Currently on Decadron and Lovenox.  Currently on oxygen at 4

L via nasal cannula.  Patient is also on antibiotics no cough ceftriaxone and 

azithromycin.  Pulmonary is following.





Current medications reviewed.





Objective





- Vital Signs


Vital signs: 


                                   Vital Signs











Temp  98.2 F   12/06/20 16:00


 


Pulse  68   12/06/20 16:00


 


Resp  19   12/06/20 16:00


 


BP  95/55   12/06/20 16:00


 


Pulse Ox  95   12/06/20 16:00








                                 Intake & Output











 12/05/20 12/06/20 12/06/20





 18:59 06:59 18:59


 


Intake Total  240 


 


Output Total   350


 


Balance  240 -350


 


Weight 104.326 kg 100.1 kg 


 


Intake:   


 


  Oral  240 


 


Output:   


 


  Urine   350


 


Other:   


 


  # Voids  2 0














- Exam





PHYSICAL EXAMINATION: 


Patient is lying in the bed comfortably, no acute distress, awake alert and 

oriented.Feels weak and lethargic.. 


HEENT: Normocephalic. Neck is supple. Pupils reactive. Nostrils clear. Oral 

cavity is moist. Ears reveal no drainage. 


Neck reveals no JVD, carotid bruits, or thyromegaly. 


CHEST EXAMINATION: Trachea is central. Symmetrical expansion. Lung fields clear 

to auscultation and percussion. Bibasilar diminished air entry.


CARDIAC: Normal S1, S2 with no gallops. No murmurs 


ABDOMEN: Soft. Bowel sounds normal. No organomegaly. No abdominal bruits. 


Extremities: reveal no edema.  No clubbing or cyanosis


Neurologically awake, alert, oriented x3 with well-coordinated movements.  No 

focal deficits noted


Skin: No rash or skin lesions. 


Psychiatric: Coperative.  Nonsuicidal


Musculoskeletal: No joint swelling or deformity.  Normal range of motion.





- Labs


CBC & Chem 7: 


                                 12/05/20 07:41





                                 12/05/20 07:41


Labs: 


                  Abnormal Lab Results - Last 24 Hours (Table)











  12/04/20 12/06/20 Range/Units





  22:58 08:10 


 


Ferritin   1840.1 H  (10.0-291.0)  ng/mL


 


Lactate Dehydrogenase   816 H  (313-618)  U/L


 


C-Reactive Protein   127.8 H  (<10.0)  mg/L


 


Coronavirus (PCR)  Detected A   (Not Detected)  








                      Microbiology - Last 24 Hours (Table)











 12/04/20 20:51 Blood Culture - Preliminary





 Blood    No Growth after 24 hours


 


 12/04/20 20:51 Blood Culture - Preliminary





 Blood    No Growth after 24 hours














Assessment and Plan


Assessment: 








Suspected acute COVID-19 pneumonia.  PCR not detected.


Acute hypoxic respiratory failure secondary to pneumonia as above


Bilateral patchy infiltrates on chest x-ray.


Seizure disorder currently on antiepileptic medications at home.


DVT prophylaxis with Lovenox


Obesity with a BMI 34.0





Plan:


Patient will be continued on oxygen via nasal cannula and titrate FiO2.  Patient

was started on dexamethasone and Lovenox subcu.  Continue with vitamin 

supplementation and follow-up closely.  Pulmonary is on board.  Further 

recommendations based on the clinical course.


Time with Patient: Greater than 30

## 2020-12-06 NOTE — P.PN
Subjective


Progress Note Date: 12/06/20








This is a pleasant 55-year-old female patient with a known history of seizures. 

She presented to the emergency room this evening with complaints of increasing 

shortness of breath cough congestion weakness.  He has been having symptoms for 

approximately 2 weeks that became worse in the last week or so.  She was found 

to be hypoxemic with O2 saturation at 84%.  Chest x-ray does show evidence of 

patchy bilateral pulmonary interstitial and airspace infiltrates suspicious for 

CoVID pneumonia.  Rapid screen was negative.  PCR was positive.  White count 

6.0.  Hemoglobin 12.7.  Lymphocytes 0.5.  D-dimer 0.73.  Arterial blood gases on

36% FiO2 revealed a pO2 of 78, pCO2 36, pH 7.48.  Sodium 141.  Potassium 5.4.  

Creatinine 0.95.  Ferritin 1946.  LDH 1257.  C-reactive protein 241.  Pro-

calcitonin 0.37.  She is seen today in consultation in the emergency room.  She 

is resting comfortably on a stretcher.  Awake and alert in no acute distress.  

She is dyspneic with minimal exertion.  Dyspneic with conversation.  Feeling a 

bit better today compared to yesterday.  She has been given Lovenox 40 mg subcu 

once.  Dexamethasone 6 mg by mouth once.  Ceftriaxone and azithromycin.








On 12/07/2020 the patient is being seen for a follow-up.  The patient is a 

confirmed case of the corona virus Covid 19 related pneumonia.  Based on the 

fact that the patient was having symptoms for more than 2 weeks, the patient was

out of the window for Remdesivir treatment and the patient was started on 

Decadron.  Currently the patient is on oxygen at 4 L per minute nasal cannula. 

This serum inflammatory markers remain to be elevated.  Ferritin is above 1500, 

LDH was 816 and CRP was 127 from yesterday.  The pro calcitonin level was mildly

elevated at 0.37.  The patient was empirically started with a combination of R

ocephin and Zithromax.








Objective





- Vital Signs


Vital signs: 


                                   Vital Signs











Temp  98.2 F   12/06/20 16:00


 


Pulse  68   12/06/20 16:00


 


Resp  19   12/06/20 16:00


 


BP  95/55   12/06/20 16:00


 


Pulse Ox  95   12/06/20 16:00








                                 Intake & Output











 12/06/20 12/06/20 12/07/20





 06:59 18:59 06:59


 


Intake Total 240 120 


 


Output Total  1050 


 


Balance 240 -930 


 


Weight 100.1 kg  


 


Intake:   


 


  Oral 240 120 


 


Output:   


 


  Urine  1050 


 


Other:   


 


  # Voids 2 0 


 


  # Bowel Movements  0 














- Exam











GENERAL EXAM: Alert, pleasant 55-year-old female patient, on 4 L nasal cannula, 

fairly comfortable in no apparent distress.


HEAD: Normocephalic.


EYES: Normal reaction of pupils, equal size.


NOSE: Clear with pink turbinates.


THROAT: No erythema or exudates.


NECK: No masses, no JVD.


CHEST: No chest wall deformity.


LUNGS: Equal air entry with bilateral scattered rhonchi


CVS: S1 and S2 normal with no audible murmur, regular rhythm.


ABDOMEN: No hepatosplenomegaly, normal bowel sounds, no guarding or rigidity.


SPINE: No scoliosis or deformity


SKIN: No rashes


CENTRAL NERVOUS SYSTEM: No focal deficits, tone is normal in all 4 extremities.


EXTREMITIES: There is no peripheral edema.  No clubbing, no cyanosis.  

Peripheral pulses are intact.








- Labs


CBC & Chem 7: 


                                 12/05/20 07:41





                                 12/05/20 07:41


Labs: 


                  Abnormal Lab Results - Last 24 Hours (Table)











  12/04/20 12/06/20 Range/Units





  22:58 08:10 


 


Ferritin   1840.1 H  (10.0-291.0)  ng/mL


 


Lactate Dehydrogenase   816 H  (313-618)  U/L


 


C-Reactive Protein   127.8 H  (<10.0)  mg/L


 


Coronavirus (PCR)  Detected A   (Not Detected)  








                      Microbiology - Last 24 Hours (Table)











 12/04/20 20:51 Blood Culture - Preliminary





 Blood    No Growth after 24 hours


 


 12/04/20 20:51 Blood Culture - Preliminary





 Blood    No Growth after 24 hours














Assessment and Plan


Plan: 








1 Acute hypoxic respiratory failure secondary to bilateral patchy infiltrates, 

suspect CoVID 19 pneumonia versus community-acquired pneumonia, PCR pending.  

Symptoms started nearly 2 weeks ago.  Outside the window for Remdesivir and the 

patient is on 02 and Decadron





2 Elevated inflammatory markers suspect secondary to COVID 19





3 History of seizures





Plan:





Confirmed CoVID 19 pneumonia


Continue Lovenox, dexamethasone


vitamin C, vitamin D, zinc, Pepcid, melatonin


Titrate the FiO2 as tolerated

## 2020-12-07 LAB
ANION GAP SERPL CALC-SCNC: 4 MMOL/L
BUN SERPL-SCNC: 25 MG/DL (ref 7–17)
CALCIUM SPEC-MCNC: 8.9 MG/DL (ref 8.4–10.2)
CELLS COUNTED: 200
CHLORIDE SERPL-SCNC: 109 MMOL/L (ref 98–107)
CO2 SERPL-SCNC: 30 MMOL/L (ref 22–30)
EOSINOPHIL # BLD MANUAL: 0.18 K/UL (ref 0–0.7)
ERYTHROCYTE [DISTWIDTH] IN BLOOD BY AUTOMATED COUNT: 4.05 M/UL (ref 3.8–5.4)
ERYTHROCYTE [DISTWIDTH] IN BLOOD: 13.2 % (ref 11.5–15.5)
GLUCOSE SERPL-MCNC: 91 MG/DL (ref 74–99)
HCT VFR BLD AUTO: 37.5 % (ref 34–46)
HGB BLD-MCNC: 11.9 GM/DL (ref 11.4–16)
LYMPHOCYTES # BLD MANUAL: 0.47 K/UL (ref 1–4.8)
MCH RBC QN AUTO: 29.5 PG (ref 25–35)
MCHC RBC AUTO-ENTMCNC: 31.8 G/DL (ref 31–37)
MCV RBC AUTO: 92.7 FL (ref 80–100)
METAMYELOCYTES # BLD: 0.12 K/UL
MONOCYTES # BLD MANUAL: 0.24 K/UL (ref 0–1)
MYELOCYTES # BLD MANUAL: 0.24 K/UL
NEUTROPHILS NFR BLD MANUAL: 77 %
NEUTS SEG # BLD MANUAL: 4.7 K/UL (ref 1.3–7.7)
PLATELET # BLD AUTO: 382 K/UL (ref 150–450)
POTASSIUM SERPL-SCNC: 4.2 MMOL/L (ref 3.5–5.1)
SODIUM SERPL-SCNC: 143 MMOL/L (ref 137–145)
WBC # BLD AUTO: 5.9 K/UL (ref 3.8–10.6)

## 2020-12-07 RX ADMIN — Medication SCH MG: at 08:47

## 2020-12-07 RX ADMIN — ALBUTEROL SULFATE SCH PUFF: 90 AEROSOL, METERED RESPIRATORY (INHALATION) at 21:26

## 2020-12-07 RX ADMIN — FAMOTIDINE SCH MG: 20 TABLET, FILM COATED ORAL at 08:46

## 2020-12-07 RX ADMIN — OXYCODONE HYDROCHLORIDE AND ACETAMINOPHEN SCH MG: 500 TABLET ORAL at 08:46

## 2020-12-07 RX ADMIN — Medication SCH MG: at 20:18

## 2020-12-07 RX ADMIN — CEFAZOLIN SCH MLS/HR: 330 INJECTION, POWDER, FOR SOLUTION INTRAMUSCULAR; INTRAVENOUS at 20:20

## 2020-12-07 RX ADMIN — ALBUTEROL SULFATE SCH PUFF: 90 AEROSOL, METERED RESPIRATORY (INHALATION) at 13:02

## 2020-12-07 RX ADMIN — Medication SCH UNIT: at 08:47

## 2020-12-07 RX ADMIN — SERTRALINE HYDROCHLORIDE SCH MG: 100 TABLET ORAL at 20:18

## 2020-12-07 RX ADMIN — ALBUTEROL SULFATE SCH PUFF: 90 AEROSOL, METERED RESPIRATORY (INHALATION) at 16:57

## 2020-12-07 RX ADMIN — ALBUTEROL SULFATE SCH PUFF: 90 AEROSOL, METERED RESPIRATORY (INHALATION) at 08:43

## 2020-12-07 RX ADMIN — FAMOTIDINE SCH MG: 20 TABLET, FILM COATED ORAL at 20:18

## 2020-12-07 RX ADMIN — ENOXAPARIN SODIUM SCH MG: 40 INJECTION SUBCUTANEOUS at 08:47

## 2020-12-07 NOTE — XR
EXAMINATION TYPE: XR chest 1V portable

 

DATE OF EXAM: 12/7/2020

 

CLINICAL HISTORY: Difficulty breathing and covid pneumonia progress study.  

 

TECHNIQUE: Single AP portable upright view of the chest is obtained.

 

COMPARISON: Chest x-ray from 3 days earlier

 

FINDINGS:  Patchy mid to lower lung opacities greatest in the periphery and basilar region remain pre
sent. Some silhouetting a portion of left heart border and hemidiaphragm again seen. Cardiac silhouet
te size stable and upper limits of normal. Upper lungs remain predominantly clear. Osseous structures
 are intact.

 

IMPRESSION: Persistent bilateral multifocal infiltrates greatest in the mid lung periphery and basila
r region consistent with covid 19 infection, no significant change from prior x-ray.

## 2020-12-07 NOTE — P.PN
Subjective





Patient is a 55-year-old female with a known history of seizure disorder came to

ER with complaints of worsening shortness of breath cough and congestion and 

generalized weakness and body aches.  Patient has been having symptoms for the 

past 2 weeks and worsened since last week.  Patient was found to be hypoxic with

pulse ox level 84% on room air on admission.  Chest x-ray showed bilateral 

pneumonia.  Normal heart.  EKG showed normal sinus rhythm.  Laboratory data 

showed WBC 6.6, hemoglobin 11.9 and platelets 2 4990 lymphocytes 0.7


D-dimer level is 0.73


Blood gases showed pH of 7.48 PCO2 36 and PO2 78


Sodium 138, potassium 5.4 and BUN 29, creatinine 0.96


Ferritin level is 1946


COVID-19 PCR not detected





12/6/2020


Patient is currently sitting in the chair awake alert oriented x3.  Patient is 

out of window for Tamiflu treatment since the patient has been having symptoms 

more than 10 days.  Currently on Decadron and Lovenox.  Currently on oxygen at 4

L via nasal cannula.  Patient is also on antibiotics no cough ceftriaxone and 

azithromycin.  Pulmonary is following.





12/07/2020


Patient the is still on the 4 L of oxygen will try to wean it off today.  

Patient appears to be mildly dehydrated is 90 and regular patient was started on

surfaces of normal saline and recheck basic metabolic profile tomorrow.





Constitutional: Denied any fatigue denied any fever.


Cardio vascular: denied any chest pain, palpitations


Gastrointestinal denied any nausea vomiting


Pulmonary: Denied any shortness of breath cough


Neurologic denied any new focal deficits





All inpatient medications were reviewed and appropriate changes in these 

medications as dictated in the interval history and assessment and plan.





Objective





- Vital Signs


Vital signs: 


                                   Vital Signs











Temp  98.5 F   12/07/20 08:00


 


Pulse  71   12/07/20 12:00


 


Resp  18   12/07/20 12:00


 


BP  107/68   12/07/20 12:00


 


Pulse Ox  98   12/07/20 12:00








                                 Intake & Output











 12/06/20 12/07/20 12/07/20





 18:59 06:59 18:59


 


Intake Total 120  


 


Output Total 1050 450 


 


Balance -930 -450 


 


Weight  101 kg 


 


Intake:   


 


  Oral 120  


 


Output:   


 


  Urine 1050 450 


 


Other:   


 


  # Voids 0  1


 


  # Bowel Movements 0  1














- Exam








PHYSICAL EXAMINATION: 





GENERAL: The patient is alert and oriented x3, not in any acute distress. Well 

developed, well nourished. 


HEENT: Pupils are round and equally reacting to light. EOMI. No scleral icterus.

No conjunctival pallor. Normocephalic, atraumatic. No pharyngeal erythema. No 

thyromegaly. 


CARDIOVASCULAR: S1 and S2 present. No murmurs, rubs, or gallops. 


PULMONARY: Chest is clear to auscultation, no wheezing or crackles. 


ABDOMEN: Soft, nontender, nondistended, normoactive bowel sounds. No palpable 

organomegaly. 


MUSCULOSKELETAL: No joint swelling or deformity.


EXTREMITIES: No cyanosis, clubbing, or pedal edema. 


NEUROLOGICAL: Gross neurological examination did not reveal any focal deficits. 


SKIN: No rashes. 





Note: Because of COVID 19 isolation, some of the history and physical exam 

findings are indirect and obtained from nursing staff, and other physician 

examinations to avoid unnecessary contact with the patient.











- Labs


CBC & Chem 7: 


                                 12/07/20 09:07





                                 12/07/20 09:07


Labs: 


                  Abnormal Lab Results - Last 24 Hours (Table)











  12/07/20 12/07/20 Range/Units





  09:07 09:07 


 


Lymphocytes # (Manual)  0.47 L   (1.0-4.8)  k/uL


 


Metamyelocytes # (Man)  0.12 H   (0)  k/uL


 


Myelocytes # (Manual)  0.24 H   (0)  k/uL


 


Chloride   109 H  ()  mmol/L


 


BUN   25 H  (7-17)  mg/dL


 


Creatinine   1.11 H  (0.52-1.04)  mg/dL








                      Microbiology - Last 24 Hours (Table)











 12/04/20 20:51 Blood Culture - Preliminary





 Blood    No Growth after 48 hours


 


 12/04/20 20:51 Blood Culture - Preliminary





 Blood    No Growth after 48 hours














Assessment and Plan


Plan: 





acute COVID-19 pneumonia.  PCR not in this hospital ration but had a positive 

screening test recently, patient is being treated for Covid 19


Acute hypoxic respiratory failure secondary to pneumonia as above


Bilateral patchy infiltrates on chest x-ray.


Seizure disorder currently on antiepileptic medications at home.


DVT prophylaxis with Lovenox


Obesity with a BMI 34.0


-Mild acute renal failure prerenal azotemia from dehydration patient was started

on IV fluids.

## 2020-12-07 NOTE — P.PN
Subjective


Progress Note Date: 12/07/20








This is a pleasant 55-year-old female patient with a known history of seizures. 

She presented to the emergency room this evening with complaints of increasing 

shortness of breath cough congestion weakness.  He has been having symptoms for 

approximately 2 weeks that became worse in the last week or so.  She was found 

to be hypoxemic with O2 saturation at 84%.  Chest x-ray does show evidence of 

patchy bilateral pulmonary interstitial and airspace infiltrates suspicious for 

CoVID pneumonia.  Rapid screen was negative.  PCR was positive.  White count 

6.0.  Hemoglobin 12.7.  Lymphocytes 0.5.  D-dimer 0.73.  Arterial blood gases on

36% FiO2 revealed a pO2 of 78, pCO2 36, pH 7.48.  Sodium 141.  Potassium 5.4.  

Creatinine 0.95.  Ferritin 1946.  LDH 1257.  C-reactive protein 241.  Pro-

calcitonin 0.37.  She is seen today in consultation in the emergency room.  She 

is resting comfortably on a stretcher.  Awake and alert in no acute distress.  

She is dyspneic with minimal exertion.  Dyspneic with conversation.  Feeling a 

bit better today compared to yesterday.  She has been given Lovenox 40 mg subcu 

once.  Dexamethasone 6 mg by mouth once.  Ceftriaxone and azithromycin.








On 12/07/2020 the patient is being seen for a follow-up.  The patient is a 

confirmed case of the corona virus Covid 19 related pneumonia.  Based on the 

fact that the patient was having symptoms for more than 2 weeks, the patient was

out of the window for Remdesivir treatment and the patient was started on 

Decadron.  Currently the patient is on oxygen at 4 L per minute nasal cannula. 

This serum inflammatory markers remain to be elevated.  Ferritin is above 1500, 

LDH was 816 and CRP was 127 from yesterday.  The pro calcitonin level was mildly

elevated at 0.37.  The patient was empirically started with a combination of R

ocephin and Zithromax.On today's evaluation that is off and a ferritin down to 

1840.  LDH is down to 816 and the CRP is down to 127.  She is not 40s about 2 by

nasal cannula with a pulse is 95%.  She is afebrile.








Objective





- Vital Signs


Vital signs: 


                                   Vital Signs











Temp  97.6 F   12/07/20 03:06


 


Pulse  65   12/07/20 03:06


 


Resp  18   12/07/20 03:06


 


BP  136/74   12/06/20 23:53


 


Pulse Ox  95   12/07/20 03:06








                                 Intake & Output











 12/06/20 12/07/20 12/07/20





 18:59 06:59 18:59


 


Intake Total 120  


 


Output Total 1050 450 


 


Balance -930 -450 


 


Weight  101 kg 


 


Intake:   


 


  Oral 120  


 


Output:   


 


  Urine 1050 450 


 


Other:   


 


  # Voids 0  


 


  # Bowel Movements 0  














- Exam











GENERAL EXAM: Alert, pleasant 55-year-old female patient, on 4 L nasal cannula, 

fairly comfortable in no apparent distress.


HEAD: Normocephalic.


EYES: Normal reaction of pupils, equal size.


NOSE: Clear with pink turbinates.


THROAT: No erythema or exudates.


NECK: No masses, no JVD.


CHEST: No chest wall deformity.


LUNGS: Equal air entry with bilateral scattered rhonchi


CVS: S1 and S2 normal with no audible murmur, regular rhythm.


ABDOMEN: No hepatosplenomegaly, normal bowel sounds, no guarding or rigidity.


SPINE: No scoliosis or deformity


SKIN: No rashes


CENTRAL NERVOUS SYSTEM: No focal deficits, tone is normal in all 4 extremities.


EXTREMITIES: There is no peripheral edema.  No clubbing, no cyanosis.  

Peripheral pulses are intact.








- Labs


CBC & Chem 7: 


                                 12/05/20 07:41





                                 12/05/20 07:41


Labs: 


                  Abnormal Lab Results - Last 24 Hours (Table)











  12/04/20 12/06/20 Range/Units





  22:58 08:10 


 


Ferritin   1840.1 H  (10.0-291.0)  ng/mL


 


Lactate Dehydrogenase   816 H  (313-618)  U/L


 


C-Reactive Protein   127.8 H  (<10.0)  mg/L


 


Coronavirus (PCR)  Detected A   (Not Detected)  








                      Microbiology - Last 24 Hours (Table)











 12/04/20 20:51 Blood Culture - Preliminary





 Blood    No Growth after 48 hours


 


 12/04/20 20:51 Blood Culture - Preliminary





 Blood    No Growth after 48 hours














Assessment and Plan


Plan: 








1 Acute hypoxic respiratory failure secondary to bilateral patchy infiltrates, 

suspect CoVID 19 pneumonia versus community-acquired pneumonia, PCR pending.  

Symptoms started nearly 2 weeks ago.  Outside the window for Remdesivir and the 

patient is on 02 and Decadron





2 Elevated inflammatory markers suspect secondary to COVID 19





3 History of seizures





Plan:





Confirmed CoVID 19 pneumonia


Continue Lovenox, dexamethasone


vitamin C, vitamin D, zinc, Pepcid, melatonin


Titrate the FiO2 as tolerated


repeat chest x-ray in the morning





Time with Patient: Less than 30

## 2020-12-08 VITALS — DIASTOLIC BLOOD PRESSURE: 59 MMHG | SYSTOLIC BLOOD PRESSURE: 102 MMHG | HEART RATE: 72 BPM

## 2020-12-08 VITALS — RESPIRATION RATE: 19 BRPM

## 2020-12-08 VITALS — TEMPERATURE: 97.4 F

## 2020-12-08 LAB
ANION GAP SERPL CALC-SCNC: 5 MMOL/L
BUN SERPL-SCNC: 24 MG/DL (ref 7–17)
CALCIUM SPEC-MCNC: 8.4 MG/DL (ref 8.4–10.2)
CHLORIDE SERPL-SCNC: 110 MMOL/L (ref 98–107)
CO2 SERPL-SCNC: 27 MMOL/L (ref 22–30)
ERYTHROCYTE [DISTWIDTH] IN BLOOD BY AUTOMATED COUNT: 4.11 M/UL (ref 3.8–5.4)
ERYTHROCYTE [DISTWIDTH] IN BLOOD: 12.8 % (ref 11.5–15.5)
GLUCOSE SERPL-MCNC: 83 MG/DL (ref 74–99)
HCT VFR BLD AUTO: 38.5 % (ref 34–46)
HGB BLD-MCNC: 12.6 GM/DL (ref 11.4–16)
MCH RBC QN AUTO: 30.6 PG (ref 25–35)
MCHC RBC AUTO-ENTMCNC: 32.6 G/DL (ref 31–37)
MCV RBC AUTO: 93.6 FL (ref 80–100)
PLATELET # BLD AUTO: 463 K/UL (ref 150–450)
POTASSIUM SERPL-SCNC: 4.3 MMOL/L (ref 3.5–5.1)
SODIUM SERPL-SCNC: 142 MMOL/L (ref 137–145)
WBC # BLD AUTO: 8.6 K/UL (ref 3.8–10.6)

## 2020-12-08 RX ADMIN — ALBUTEROL SULFATE SCH PUFF: 90 AEROSOL, METERED RESPIRATORY (INHALATION) at 09:25

## 2020-12-08 RX ADMIN — CEFAZOLIN SCH MLS/HR: 330 INJECTION, POWDER, FOR SOLUTION INTRAMUSCULAR; INTRAVENOUS at 06:15

## 2020-12-08 RX ADMIN — Medication SCH MG: at 08:15

## 2020-12-08 RX ADMIN — Medication SCH UNIT: at 08:15

## 2020-12-08 RX ADMIN — ENOXAPARIN SODIUM SCH MG: 40 INJECTION SUBCUTANEOUS at 08:15

## 2020-12-08 RX ADMIN — ALBUTEROL SULFATE SCH: 90 AEROSOL, METERED RESPIRATORY (INHALATION) at 13:19

## 2020-12-08 RX ADMIN — OXYCODONE HYDROCHLORIDE AND ACETAMINOPHEN SCH MG: 500 TABLET ORAL at 08:15

## 2020-12-08 RX ADMIN — FAMOTIDINE SCH MG: 20 TABLET, FILM COATED ORAL at 08:15

## 2020-12-08 NOTE — XR
EXAMINATION TYPE: XR chest 1V portable

 

DATE OF EXAM: 12/8/2020

 

COMPARISON: 12/7/2020

 

INDICATION: Covid pneumonia

 

TECHNIQUE: Single frontal view of the chest is obtained.

 

FINDINGS:  

The heart size is normal.  

The pulmonary vasculature is normal.  

Patchy infiltrates are present through the bilateral mid and lower lung fields. Findings are worsenin
g over the interval.  

 

 

IMPRESSION:  

1. Worsening bilateral infiltrates. Correlate for atypical pneumonia

## 2020-12-08 NOTE — P.DS
Providers


Date of admission: 


12/04/20 22:07





Attending physician: 


Kailyn Bonner





Consults: 





                                        





12/04/20 22:08


Consult Physician Urgent 


   Consulting Provider: Mic Stephenson Reason/Comments: hypoxic resp failure


   Do you want consulting provider notified?: Yes











Primary care physician: 


Stated None





Hospital Course: 


55-year-old female with a known history of seizure disorder came to ER with 

complaints of worsening shortness of breath cough and congestion and generalized

weakness and body aches.  Patient has been having symptoms for the past 2 weeks 

and worsened since last week.  Patient was found to be hypoxic with pulse ox 

level 84% on room air on admission.  Chest x-ray showed bilateral pneumonia.  

Normal heart.  EKG showed normal sinus rhythm.  Laboratory data showed WBC 6.6, 

hemoglobin 11.9 and platelets 2 4990 lymphocytes 0.7


D-dimer level is 0.73


Blood gases showed pH of 7.48 PCO2 36 and PO2 78


Sodium 138, potassium 5.4 and BUN 29, creatinine 0.96


Ferritin level is 1946


COVID-19 PCR not detected





12/6/2020


Patient is currently sitting in the chair awake alert oriented x3.  Patient is 

out of window for Tamiflu treatment since the patient has been having symptoms 

more than 10 days.  Currently on Decadron and Lovenox.  Currently on oxygen at 4

L via nasal cannula.  Patient is also on antibiotics no cough ceftriaxone and 

azithromycin.  Pulmonary is following.





12/07/2020


Patient the is still on the 4 L of oxygen will try to wean it off today.  

Patient appears to be mildly dehydrated is 90 and regular patient was started on

surfaces of normal saline and recheck basic metabolic profile tomorrow.





12/08/2020


Patient is on 3 L of oxygen saturating well as patient's symptoms started 10 

days before admission of probability of this getting worse is..  Patient will be

discharged on 7 more doses days of Decadron completing 10 day of therapy.





PHYSICAL EXAMINATION: 





GENERAL: The patient is alert and oriented x3, not in any acute distress. Well 

developed, well nourished. 


HEENT: Pupils are round and equally reacting to light. EOMI. No scleral icterus.

No conjunctival pallor. Normocephalic, atraumatic. No pharyngeal erythema. No 

thyromegaly. 


CARDIOVASCULAR: S1 and S2 present. No murmurs, rubs, or gallops. 


PULMONARY: Chest is clear to auscultation, no wheezing or crackles. 


ABDOMEN: Soft, nontender, nondistended, normoactive bowel sounds. No palpable 

organomegaly. 


MUSCULOSKELETAL: No joint swelling or deformity.


EXTREMITIES: No cyanosis, clubbing, or pedal edema. 


NEUROLOGICAL: Gross neurological examination did not reveal any focal deficits. 


SKIN: No rashes. 





Note: Because of COVID 19 isolation, some of the history and physical exam 

findings are indirect and obtained from nursing staff, and other physician 

examinations to avoid unnecessary contact with the patient.








Assessment and Plan


Plan: 





acute COVID-19 pneumonia.  PCR not positive but had a positive screening test re

cently, patient is being treated for Covid 19.  Patient appears to have Covid 19

and that was admitted for post-Covid pneumonitis.


Acute hypoxic respiratory failure secondary to pneumonia as above


Seizure disorder currently on antiepileptic medications at home.





Obesity with a BMI 34.0


-Mild acute renal failure prerenal azotemia from dehydration improvement with IV

fluids








Patient Condition at Discharge: Serious





Plan - Discharge Summary


Discharge Rx Participant: No


New Discharge Prescriptions: 


New


   dexAMETHasone [Hexadrol] 6 mg PO DAILY #7 tab


   Melatonin 5 mg PO HS #20 tablet


   Zinc Sulfate [Orazinc] 220 mg PO DAILY #20 cap


   Famotidine [Pepcid] 20 mg PO BID #14 tab


   Albuterol Inhaler [Ventolin Hfa Inhaler] 2 puff INHALATION RT-QID #1 inhaler


   Ascorbic Acid [Vitamin C] 1,000 mg PO DAILY #20 tab


   Cholecalciferol [Vitamin D3 (25 Mcg = 1000 Iu)] 1,000 unit PO DAILY #20 tab





Continue


   Sertraline [Zoloft] 100 mg PO HS


   Ferrous Sulfate [Iron (65 MG Elemental)] 325 mg PO DAILY


   lamoTRIgine 300 mg PO BID


   cloBAZam [Clobazam] 50 mg PO HS





Discontinued


   Potassium Gluconate 99 mg PO DAILY


Discharge Medication List





Ferrous Sulfate [Iron (65 MG Elemental)] 325 mg PO DAILY 12/04/20 [History]


Sertraline [Zoloft] 100 mg PO HS 12/04/20 [History]


cloBAZam [Clobazam] 50 mg PO HS 12/04/20 [History]


lamoTRIgine 300 mg PO BID 12/04/20 [History]


Albuterol Inhaler [Ventolin Hfa Inhaler] 2 puff INHALATION RT-QID #1 inhaler 

12/08/20 [Rx]


Ascorbic Acid [Vitamin C] 1,000 mg PO DAILY #20 tab 12/08/20 [Rx]


Cholecalciferol [Vitamin D3 (25 Mcg = 1000 Iu)] 1,000 unit PO DAILY #20 tab 

12/08/20 [Rx]


Famotidine [Pepcid] 20 mg PO BID #14 tab 12/08/20 [Rx]


Melatonin 5 mg PO HS #20 tablet 12/08/20 [Rx]


Zinc Sulfate [Orazinc] 220 mg PO DAILY #20 cap 12/08/20 [Rx]


dexAMETHasone [Hexadrol] 6 mg PO DAILY #7 tab 12/08/20 [Rx]








Follow up Appointment(s)/Referral(s): 


[facility], [NON-STAFF] - 


Fidelina Orlando MD [REFERRING] - 1 Week


Mic Stephenson DO [Doctor of Osteopathic Medicine] - 1 Week


Discharge Disposition: HOME WITH HOME HEALTH SERVICES

## 2020-12-08 NOTE — P.PN
Subjective


Progress Note Date: 12/08/20








This is a pleasant 55-year-old female patient with a known history of seizures. 

She presented to the emergency room this evening with complaints of increasing 

shortness of breath cough congestion weakness.  He has been having symptoms for 

approximately 2 weeks that became worse in the last week or so.  She was found 

to be hypoxemic with O2 saturation at 84%.  Chest x-ray does show evidence of 

patchy bilateral pulmonary interstitial and airspace infiltrates suspicious for 

CoVID pneumonia.  Rapid screen was negative.  PCR was positive.  White count 

6.0.  Hemoglobin 12.7.  Lymphocytes 0.5.  D-dimer 0.73.  Arterial blood gases on

36% FiO2 revealed a pO2 of 78, pCO2 36, pH 7.48.  Sodium 141.  Potassium 5.4.  

Creatinine 0.95.  Ferritin 1946.  LDH 1257.  C-reactive protein 241.  Pro-

calcitonin 0.37.  She is seen today in consultation in the emergency room.  She 

is resting comfortably on a stretcher.  Awake and alert in no acute distress.  

She is dyspneic with minimal exertion.  Dyspneic with conversation.  Feeling a 

bit better today compared to yesterday.  She has been given Lovenox 40 mg subcu 

once.  Dexamethasone 6 mg by mouth once.  Ceftriaxone and azithromycin.








On 12/07/2020 the patient is being seen for a follow-up.  The patient is a 

confirmed case of the corona virus Covid 19 related pneumonia.  Based on the 

fact that the patient was having symptoms for more than 2 weeks, the patient was

out of the window for Remdesivir treatment and the patient was started on 

Decadron.  Currently the patient is on oxygen at 4 L per minute nasal cannula. 

This serum inflammatory markers remain to be elevated.  Ferritin is above 1500, 

LDH was 816 and CRP was 127 from yesterday.  The pro calcitonin level was mildly

elevated at 0.37.  The patient was empirically started with a combination of R

ocephin and Zithromax.On today's evaluation that is off and a ferritin down to 

1840.  LDH is down to 816 and the CRP is down to 127.  She is not 40s about 2 by

nasal cannula with a pulse is 95%.  She is afebrile.








oon 12/08/2020, the patient on 3 L of oxygen by nasal cannula.The patient has no

specific complaints.  Repeat chest x-ray showed bilateral pulmonary infiltrates 

slightly worse on the right today's evaluation.  No fever.  No chills.  No 

nausea or vomiting.  She is on Decadron 6 mg by mouth daily.  She is on Lovenox 

40 mg subcu for DVT prophylaxis.  No nausea.  No diarrhea.  No altered 

mentation.  She is still feeling weak.





Objective





- Vital Signs


Vital signs: 


                                   Vital Signs











Temp  97.5 F L  12/08/20 04:00


 


Pulse  80   12/08/20 04:00


 


Resp  18   12/08/20 04:00


 


BP  116/73   12/08/20 04:00


 


Pulse Ox  98   12/08/20 04:00








                                 Intake & Output











 12/07/20 12/08/20 12/08/20





 18:59 06:59 18:59


 


Intake Total 238 600 


 


Balance 238 600 


 


Intake:   


 


  IV  600 


 


    0.9  600 


 


  Oral 238  


 


Other:   


 


  Voiding Method  Bedside Commode 


 


  # Voids 4 1 


 


  # Bowel Movements 1  














- Exam











GENERAL EXAM: Alert, pleasant 55-year-old female patient, on 3 L nasal cannula, 

fairly comfortable in no apparent distress.


HEAD: Normocephalic.


EYES: Normal reaction of pupils, equal size.


NOSE: Clear with pink turbinates.


THROAT: No erythema or exudates.


NECK: No masses, no JVD.


CHEST: No chest wall deformity.


LUNGS: Equal air entry with bilateral scattered rhonchi


CVS: S1 and S2 normal with no audible murmur, regular rhythm.


ABDOMEN: No hepatosplenomegaly, normal bowel sounds, no guarding or rigidity.


SPINE: No scoliosis or deformity


SKIN: No rashes


CENTRAL NERVOUS SYSTEM: No focal deficits, tone is normal in all 4 extremities.


EXTREMITIES: There is no peripheral edema.  No clubbing, no cyanosis.  

Peripheral pulses are intact.








- Labs


CBC & Chem 7: 


                                 12/08/20 08:54





                                 12/07/20 09:07


Labs: 


                  Abnormal Lab Results - Last 24 Hours (Table)











  12/07/20 12/07/20 12/08/20 Range/Units





  09:07 09:07 08:54 


 


Plt Count    463 H  (150-450)  k/uL


 


Lymphocytes # (Manual)  0.47 L    (1.0-4.8)  k/uL


 


Metamyelocytes # (Man)  0.12 H    (0)  k/uL


 


Myelocytes # (Manual)  0.24 H    (0)  k/uL


 


Chloride   109 H   ()  mmol/L


 


BUN   25 H   (7-17)  mg/dL


 


Creatinine   1.11 H   (0.52-1.04)  mg/dL








                      Microbiology - Last 24 Hours (Table)











 12/04/20 20:51 Blood Culture - Preliminary





 Blood    No Growth after 72 hours


 


 12/04/20 20:51 Blood Culture - Preliminary





 Blood    No Growth after 72 hours














Assessment and Plan


Plan: 








1 Acute hypoxic respiratory failure secondary to bilateral patchy infiltrates, 

suspect CoVID 19 pneumonia versus community-acquired pneumonia,.  Symptoms 

started nearly 2 weeks ago.  Outside the window for Remdesivir and the patient 

is on 02 and Decadroncurrently she is on 3 L of oxygen by nasal cannula.  Oxygen

flow would be gradually weaned off.  The chest x-ray still showing bilateral 

pulmonary infiltrates.  Probably slightly worse on the right.  Nevertheless the 

x-ray is waxing and waning in the patient's x-ray typically lags behind the 

clinical picture.





2 Elevated inflammatory markers suspect secondary to COVID 19





3 History of seizures





Plan:





Confirmed CoVID 19 pneumonia


Continue Lovenox, dexamethasone


vitamin C, vitamin D, zinc, Pepcid, melatonin


Titrate the FiO2 as tolerated,, currently on 3 L.


consider discharging this patient home with home oxygen and pulse ox monitoring.